# Patient Record
Sex: MALE | URBAN - METROPOLITAN AREA
[De-identification: names, ages, dates, MRNs, and addresses within clinical notes are randomized per-mention and may not be internally consistent; named-entity substitution may affect disease eponyms.]

---

## 2019-11-27 ENCOUNTER — APPOINTMENT (OUTPATIENT)
Age: 69
Setting detail: DERMATOLOGY
End: 2019-12-04

## 2019-11-27 VITALS — WEIGHT: 168 LBS | HEIGHT: 69 IN | RESPIRATION RATE: 16 BRPM

## 2019-11-27 DIAGNOSIS — D22 MELANOCYTIC NEVI: ICD-10-CM

## 2019-11-27 DIAGNOSIS — L82.0 INFLAMED SEBORRHEIC KERATOSIS: ICD-10-CM

## 2019-11-27 DIAGNOSIS — D485 NEOPLASM OF UNCERTAIN BEHAVIOR OF SKIN: ICD-10-CM

## 2019-11-27 PROBLEM — D48.5 NEOPLASM OF UNCERTAIN BEHAVIOR OF SKIN: Status: ACTIVE | Noted: 2019-11-27

## 2019-11-27 PROBLEM — D22.5 MELANOCYTIC NEVI OF TRUNK: Status: ACTIVE | Noted: 2019-11-27

## 2019-11-27 PROCEDURE — OTHER COUNSELING: OTHER

## 2019-11-27 PROCEDURE — OTHER ADDITIONAL NOTES: OTHER

## 2019-11-27 PROCEDURE — 99213 OFFICE O/P EST LOW 20 MIN: CPT | Mod: 25

## 2019-11-27 PROCEDURE — OTHER LIQUID NITROGEN: OTHER

## 2019-11-27 PROCEDURE — 17110 DESTRUCT B9 LESION 1-14: CPT

## 2019-11-27 ASSESSMENT — LOCATION SIMPLE DESCRIPTION DERM
LOCATION SIMPLE: RIGHT FOREHEAD
LOCATION SIMPLE: LEFT UPPER BACK
LOCATION SIMPLE: LEFT NOSE
LOCATION SIMPLE: RIGHT UPPER BACK
LOCATION SIMPLE: RIGHT NOSE

## 2019-11-27 ASSESSMENT — LOCATION ZONE DERM
LOCATION ZONE: NOSE
LOCATION ZONE: FACE
LOCATION ZONE: TRUNK

## 2019-11-27 ASSESSMENT — LOCATION DETAILED DESCRIPTION DERM
LOCATION DETAILED: RIGHT NASAL ALA
LOCATION DETAILED: RIGHT SUPERIOR UPPER BACK
LOCATION DETAILED: LEFT INFERIOR UPPER BACK
LOCATION DETAILED: LEFT NASAL ALA
LOCATION DETAILED: RIGHT SUPERIOR FOREHEAD

## 2019-11-27 NOTE — PROCEDURE: ADDITIONAL NOTES
Additional Notes: Advised patient that we would like to see him back in one year to re-check the mole. Advised that he can have his wife take a photo of the mole so they can keep an eye out for changes.
Detail Level: Simple
Additional Notes: Patient reports irritation and previous bleeding of a small raised lesion on his left nasal ala.\\nAdvised that it should be tested if this happens again. Patient expressed understanding and agrees to monitor it for the time being.\\nHe is instructed to rtc if symptoms persist.